# Patient Record
(demographics unavailable — no encounter records)

---

## 2017-10-30 NOTE — NUR
RECIEVED FROM CATH LAB. RIGHT GROIN SOFT WITH DRSG DRY AND INTACT. V/S STABLE.
TELEMERTY SHOWS SR. DENIES ANY NEEDS. FAMILY AT BEDSIDE. WILL MONITOR

## 2017-10-30 NOTE — HEMODYNAMI
PATIENT:DOM CHANG JR                           MEDICAL RECORD: [id number]
: 54                                            LOCATION:[facility]     D.[address]
[facility]# [id number]                                       ADMISSION DATE: 10/30/17
 
 
 Generatedon:10/31/553593:09
Patient name: DOM CHANG Patient #: [id number] Visit #: [id number] SSN: :
 1954 Date
of study: 10/31/2017
Page: Of
Hemodynamic Procedure Report
****************************
Patient Data
Patient Demographics
Procedure consent was obtained
First Name: DOM          Gender: Male
Last Name: BERNARDO            Suffix: Jr Espan[place] Initial: KEVIN      : 1954
Patient #: [id number]       Age: 63 year(s)
Visit #: [id number]       Race: 
Accession #:
[id number]
Additional ID: [id number]
Contact details
Address: [address] [place] [address]      Phone: [phone]
State: [place]
City: [place]
Zip code: [address]
Past Medical History
Allergies: No known allergies
Admission
Admission Data
Admission Date: 10/30/2017  Admission Time: 7:28
Arrival Date: 10/30/2017    Arrival Time: 9:30
Admit Source: Other         Insurance Payor: Private
Room #: D.2120              health insurance
Lab Results
Lab Result Date: 10/30/2017 Lab Result Time: 0:00
Biochemistry
Name         Units    Result                Min      Max
BUN          mg/dl    18       --(---*)--   7        18
Creatinine   mg/dl    1.1      --(--*-)--   0.6      1.3
CBC
Name         Units    Result                Min      Max
Hemoglobin   g/dl     15       --(-*--)--   13.5     17.5
Procedure
Procedure Types
Cath Procedure
Diagnostic Procedure
FFR/IVUS
Intra-Coronary IVUS Initial
PCI Procedure
Coronary Stent Initial
Miscellaneous Procedures
Moderate Sedation up to 15 minutes
Procedure Description
Procedure Date
Procedure Date: 10/31/2017
Procedure Start Time: 10:56
 
Procedure End Time: 11:08
Procedure Staff
Name                            Function
Mike Faulkner MD                Performing Physician
Denver Wilkinson RN              Nurse
Deanna Cruz RT               Scrub
Dandre Curiel RT                  Monitor
Procedure Data
Cath Procedure
Fluoroscopy
Diagnostic fluoroscopy      Total fluoroscopy Time: 1.5
time: 1.5 min               min
Diagnostic fluoroscopy      Total fluoroscopy dose: 160
dose: 160 mGy               mGy
Contrast Material
Contrast Material Type                       Amount (ml)
Isovue 300                                   27
Entry Location
Entry     Primary  Successful  Side  Size  Upsize Upsize Entry    Closure Succes
sful  Closure
Location                             (Fr)  1 (Fr) 2 (Fr) Remarks  Device        
      Remarks
Femoral                        Left  6 Fr                         Exoseal
artery                               Short
Estimated blood loss: 10 ml
Procedure Complications
No complications
Procedure Medications
Medication           Administration Route Dosage
0.9% NaCl            I.V.                 100 ml/hr
Oxygen               NC                   2 l/min
Heparin Flush Bag    added to field       2 bags
(1000units/500ml NS)
Lidocaine 2%         added to field       20
Versed               I.V.                 2 mg
Fentanyl             I.V.                 100 mcg
Fentanyl             I.V.                 50 mcg
Versed               I.V.                 2 mg
Heparin Bolus        I.V.                 4000 units
Fentanyl             I.V.                 50 mcg
Hemodynamics
Rest
HGB: 15 (g/dl) Heart Rate: 65 (bpm)
Snapshots
Pre Cath      Intra         NCS           Post Cath
Vital Signs
Time     Heart  Resp   SPO2 etCO2   NIBP (mmHg) Rhythm  Pain    Sedation
Rate   (ipm)  (%)  (mmHg)                      Status  Level
(bpm)
10:34:07 74     25     100  0       102/76(100) NSR     0 (11)  10(A)
, No
pain
10:39:49 71     13     100  34.7    145/74(123) NSR     0 (11)  10(A)
, No
pain
10:44:32 70     18     100  29.4    131/76(110) NSR     0 (11)  10(A)
, No
pain
10:49:10 64     16     96   29.4    138/73(103) NSR     0 (11)  10(A)
, No
 
pain
10:53:53 70     15     98   30.9    155/73(105) NSR     0 (11)  10(A)
, No
pain
10:58:37 70     16     99   32.4    140/76(101) NSR     0 (11)  10(A)
, No
pain
11:03:20 76     22     100  8.3     142/65(107) NSR     0 (11)  10(A)
, No
pain
11:08:02 73     12     99   30.2    142/80(109) NSR     0 (11)  10(A)
, No
pain
Medications
Time     Medication       Route  Dose  Verified Delivered Reason          Notes 
 Effectiveness
by       by
10:39:07 0.9% NaCl        I.V.   100   Denver  Denver   Per physician
ml/hr Minh Wilkinson RN       RN
10:39:29 Oxygen           NC     2     Denver  Denver   Per physician
l/min Minh Wilkinson RN       RN
10:39:42 Heparin Flush    added  2     Denver  Denver   used for
Bag              to     bags  Minh Wilkinson   procedure
(1000units/500ml field        RN       RN
NS)
10:40:03 Lidocaine 2%     added  20ml  Denver  Denver   for local
to     vial  Lorchad Wilkinson   anesthetic
field        RN       RN
10:53:13 Versed           I.V.   2 mg  Denver  Denver   for sedation
Minh Wilkinson
RN       RN
10:53:21 Fentanyl         I.V.   100   Denver  Denver   for sedation
mcg   Minh Wilkinsno
RN       RN
10:55:53 Fentanyl         I.V.   50    Denver  Denver   for sedation
mcg   Minh Wilkinson RN       RN
10:56:04 Versed           I.V.   2 mg  Denver  Denver   for sedation
Minh Wilkinson RN       RN
10:59:29 Heparin Bolus    I.V.   4000  Denver  Denver   for
units Minh Wilkinson   anticoagulation
RN       RN
11:02:57 Fentanyl         I.V.   50    Denver  Denver   for sedation
mcg   Minh Wilkinson RN       RN
Procedure Log
Time     Note
10:13:50 Denver Wilkinson RN sent for patient. Start room use.
10:13:51 Time tracking: Regular hours
10:13:55 Plan of Care:Hemodynamics will remain stable., Cardiac
rhythm will remain stable., Comfort level will be
maintained., Respiratory function will remain
adequate., Patient/ family verbilizes understanding of
procedure., Procedure tolerated without complication.,
Recovers from procedure without complications..
10:24:54 H&P Date Dictated: 10/30/2017 Within 30 days and on
chart..
 
10:26:42 Lab results completed and on chart.
10:28:37 Patient received from Med II to CCL 1 Alert and
oriented. Tansferred to table in Supine position.
10:28:39 Warm blankets applied, and jorge hugger turned on for
patient comfort.
10:28:39 Correct patient and procedure confirmed by team.
10:28:40 Signed procedure consent form obtained from patient.
10:28:41 ECG and BP/O2 sat monitors applied to patient.
10:33:07 Vital chart was started
10:39:07 0.9% NaCl 100 ml/hr I.V. was administered by Denvre
Lorigan RN; Per physician;
10:39:29 Oxygen 2 l/min NC was administered by Denver Wilkinson
RN; Per physician;
10:39:42 Heparin Flush Bag (1000units/500ml NS) 2 bags added to
field was administered by Denver Wilkinson RN; used for
procedure;
10:40:03 Lidocaine 2% 20ml vial added to field was administered
by Denver Wilkinson RN; for local anesthetic;
10:42:02 Zero performed for pressure channel P1
10:43:36 Baseline sample Acquired.
10:44:11 Rhythm: sinus rhythm
10:44:13 Full Disclosure recording started
10:44:17 Pre-procedure instructions explained to patient.
10:44:17 Pre-op teaching completed and patient verbalized
understanding.
10:44:18 Family in waiting room.
10:44:20 Patient NPO since Midnight.
10:44:24 Patient allergic to No known allergies
10:44:27 Is the patient allergic to Iodine/contrast media? No.
10:44:28 Is patient on blood thinner?Yes
10:44:31 **ACC** The patient was administered the following
blood thiners within the last 24 hours: **ACC**Plavix
10:44:32 Patient diabetic? Yes.
10:44:33 If diabetic: On Metformin? Yes
10:44:38 If on Metformin: Last Dose? 10/28/2017
10:44:42 Previous problem with sedation/anesthesia? No ?
10:44:43 Snore? Yes
10:44:44 Sleep apnea? No
10:44:45 Deviated septum? No
10:44:46 Opens mouth fully? Yes
10:44:47 Sticks out tongue? Yes
10:44:49 Airway obstruction? No ?
10:44:51 Dentures? No ?
10:44:56 Pre procedure: left posterior tibial pulse 1+
Palpable, but thready & weak; easily obliterated
10:45:02 Patient pain scale 0/10 ?.
10:45:26 IV patent on arrival in left hand with 0.9% NaCl at
[facility].
10:45:38 Left groin area was prepped with chlora-prep and
draped in sterile fashion
10:45:39 Alarms reviewed by R. N.
10:45:39 Sharps counted by scrub and verified by R.N.
10:45:42 Use device set Femoral PCI
10:45:43 Tegaderm 4 x 4 opened to sterile field.
10:45:43 Acist Manifold opened to sterile field.
10:45:45 Acist Hand Control opened to sterile field.
10:45:45 Acist Syringe opened to sterile field.
10:45:46 Bag Decanter opened to sterile field.
10:45:46 Medline Cath Pack opened to sterile field.
10:45:46 Terumo 6Fr Java Sheath opened to sterile field.
 
10:45:47 St Jacques 260cm J .035 wire opened to sterile field.
10:45:47 Merit BasixCompak Inflation Kit opened to sterile
field.
10:45:58 Bush Whisper J 300cm 0.014 guide wire opened to
sterile field.
10:45:59 PERCUTANEOUS ENTRY 19GA needle opened to sterile
field.
10:48:26 Zero performed for pressure channel P1
10:50:57 Physician arrived
10:50:57 --------ALL STOP TIME OUT------
10:50:58 Final Timeout: patient, procedure, and site verified
with staff and physician. All members of the team are
in agreement.
10:51:00 Left groin site verified by team.
10:51:06 Physical assessment completed. ASA score P 2 - A
patient with mild systemic disease as per Mike Faulkner MD.
10:51:09 Sedation plan: IV Moderate Sedation Versed, Fentanyl
10:53:13 Versed 2 mg I.V. was administered by Denver Wilkinson
RN; for sedation;
10:53:21 Fentanyl 100 mcg I.V. was administered by Denver Wilkinson RN; for sedation;
10:55:53 Fentanyl 50 mcg I.V. was administered by Denver Wilkinson RN; for sedation;
10:56:04 Versed 2 mg I.V. was administered by Denver Wilkinson
RN; for sedation;
10:56:11 Procedure started.
10:56:15 Local anesthetic to left femerol artery with Lidocaine
2% by Mike Faulkner MD.**INITIAL ACCESS ONLY**
10:56:23 A 6 Fr Short sheath was inserted into the Left Femoral
artery
10:56:30 Medtronic Launcher 6Fr HS I SH guide catheter opened
to sterile field.
10:56:41 6 Fr HSI SH guide catheter was inserted over the wire
10:56:50 Volcano Platinum Eagleye IVUS Catheter opened to
sterile field.
10:57:48 whisper wire advanced.
10:57:52 Wire advanced across lesion.
10:59:29 Heparin Bolus 4000 units I.V. was administered by
Denver Wilkinson RN; for anticoagulation;
10:59:57 IVUS catheter advanced over wire.
10:59:59 IVUS pass to RCA lesion performed.
11:00:00 IVUS catheter removed over wire.
11:01:08 Inflation Number: 1 A Evan OTW 2.75 x 38 stent was
prepped and advanced across the Mid RCA. The stent was
deployed at 17 FRANCOIS for 0:10 (min:sec).
11::26 Stent catheter was removed intact over wire.
11::28 Wire removed.
11:02:14 Cordis 6Fr Exoseal opened to sterile field.
11:02:24 Sheath removed intact; hemostasis achieved with
Exoseal to the Left Femoral artery.
11:02:26 Procedure ended.(Physican Out)
11:02:57 Fentanyl 50 mcg I.V. was administered by Denver Wilkinson RN; for sedation;
11:06:44 Fluoroscopy time 01.50 minutes.
11:06:47 Flurop Dose total: 160
11:06:47 Fluoroscopy dose: 160 mGy
11:06:51 Contrast amount:Isovue 300 27ml.
11:06:52 Sharps counted by scrub and verified by R.N.
11:06:54 Insertion/operative site no bleeding no hematoma.
 
11:06:57 Post-op/insertion site Left Femoral artery dressed
using a 4 x 4 and Tegaderm.
11:07:02 Post left femerol artery:stable, soft, clean and dry
11:07:04 Post Procedure Pulses reassessed and unchanged
11:07:05 Post-procedure physical assessment completed. ASA
score P 2 - A patient with mild systemic disease as
per Mike Faulkner MD.
11:07:07 Post procedure rhythm: unchanged.
11:07:10 Estimated blood loss: 10 ml
11:07:11 Post procedure instruction explained to
patient.Patient verbalizes understanding.
11:07:12 Patient needs reinforcement of post procedure
teaching.
11:08:21 Procedure type changed to Cath procedure, Diagnostic
procedure, FFR/IVUS, Intra-Coronary IVUS Initial, PCI
procedure, Coronary Stent Initial, Miscellaneous
Procedures, Moderate Sedation up to 15 minutes
11:08:42 Procedure and supply charges have been captured,
reviewed, submitted and are correct.
11:08:45 Procedure Complication : No complications
11:08:47 Vital chart was stopped
11:08:47 See physician's report for complete and final results.
11:08:49 Report given to Pre/Post Procedure Room.
11:08:51 Patient transfered to Pre/Post Procedure Room with
Stretcher.
11:08:57 Procedure ended.
11:08:57 Full Disclosure recording stopped
11:09:09 End room use (Document Last)
Intervention Summary
Intervention Notes
Time     ActionType  Lesion and  Equipment Action#  Pressure  Duration
Attributes  Used
11:01:08 Place stent Mid RCA     [place] OTW  1        17        00:10
2.75 x 38
stent
Device Usage
Item Name    Manufacture  Quantity  Catalog      Hospital Part     Current Minim
al Lot# /
Number       Charge   Number   Stock   Stock   Serial#
Code
Tegaderm 4 x 3M           1         [id number]        [id number]   310673   658175  5
4
Acist        Acist        1         46164        489067   667974   831829  5
Manifold     [employer]s Inc
Acist Hand   Acist        1         58665        070901   513748   729322  5
Control      [employer]
Systems Inc
Acist        Acist        1         00269        814825   288650   526077  20
Syringe      Medical
Systems Inc
Bag Decanter Microtek     1         2002S        782769   88955    067151  5
Medical Inc.
Medline Cath Cardinal     1         [id number]    535273   70493    040585  5
TopOPPS         Health
Terumo 6Fr   Terumo       1         MTP284       568593   820729   879290  40
Java
Sheath
St Jacques      St Jacques      1         038172       751786   495816   120843  30
260cm J .035
 
wire
Merit        Merit        1         [id number]       211081   350332   548765  15
BasixCompak  Medical
Inflation
Kit
Bush       Bush       1         [id number]    181036   671167   879035  5
Whisper J    Vascular
300cm 0.014
guide wire
PERCUTANEOUS [employer] 1         [id number]       965679            136355  5
ENTRY 19GA
needle
Medtronic    Medtronic    1         [id number]     867428   66883    816564  1
Launcher 6Fr
HS I SH
guide
catheter
Volcano      Plano      1         [id number]       127259   279284   237437  8
Platinum
Eagleye IVUS
Catheter
[place] OTW     Medtronic    1         [id number]  003643   6497544  213824  5    
   [id number]
2.75 x 38
stent
Cordis 6Fr   Cardinal     1                 590128   401751   872270  10
[facility]      Health
Signature Audit [place]
Stage           Time        Signature      Unsigned
Intra-Procedure 10/31/2017  Dandre Curiel
11:09:38 AM RT(R)
Signatures
Monitor : Dandre Curiel RT Signature :
______________________________
Date : ______________ Time :
______________
 
 
 
 
 
 
 
 
 
 
 
 
 
 
 
 
 
 
 
[place] [id number] [place] [phone]

## 2017-10-30 NOTE — HEMODYNAMI
PATIENT:DOM CHANG JR                           MEDICAL RECORD: [id number]
: 54                                            LOCATION:D.CAT          
ACCT# [id number]                                       ADMISSION DATE: 10/30/17
 
 
 Generatedon:10/30/235393:00
Patient name: DOM CHANG Patient #: [id number] Visit #: [id number] SSN: :
 1954 Date
of study: 10/30/2017
Page: Of
Hemodynamic Procedure Report
****************************
Patient Data
Patient Demographics
Procedure consent was obtained
First Name: DOM          Gender: Male
Last Name: BERNARDO            Suffix: Jr Espana Initial: KEVIN      : 1954
Patient #: [id number]       Age: 63 year(s)
Visit #: [id number]       Race: 
Accession #:
[id number]
Additional ID: [id number]
Contact details
Address: [address] [place] [address]      Phone: [phone]
State: [place]
City: [place]
Zip code: [address]
Admission
Admission Data
Admission Date: 10/30/2017  Admission Time: 7:28
Arrival Date: 10/30/2017    Arrival Time: 9:30
Admit Source: Other         Insurance Payor: Private
health insurance
Lab Results
Lab Result Date: 10/30/2017 Lab Result Time: 0:00
Biochemistry
Name         Units    Result                Min      Max
BUN          mg/dl    18       --(---*)--   7        18
Creatinine   mg/dl    1.1      --(--*-)--   0.6      1.3
CBC
Name         Units    Result                Min      Max
Hemoglobin   g/dl     15       --(-*--)--   13.5     17.5
Procedure
Procedure Types
Cath Procedure
Diagnostic Procedure
LHC
LHC w/Coronaries w/Grafts
PCI Procedure
Coronary Stent Initial x2
Miscellaneous Procedures
Moderate Sedation up to 30 minutes
Procedure Description
Procedure Date
Procedure Date: 10/30/2017
Procedure Start Time: 9:33
Procedure End Time: 9:54
Procedure Staff
 
Name                            Function
Mike Faulkner MD                Performing Physician
Lisa Amos RT               Scrub
Jayce Harden RN                Nurse
Yolanda Alvarez RT                    Monitor
Procedure Data
Cath Procedure
Fluoroscopy
Diagnostic fluoroscopy      Total fluoroscopy Time: 6
time: 6 min                 min
Diagnostic fluoroscopy      Total fluoroscopy dose: 409
dose: 409 mGy               mGy
Contrast Material
Contrast Material Type                       Amount (ml)
Isovue 300                                   127
Entry Location
Entry     Primary  Successful  Side  Size  Upsize Upsize Entry    Closure Succes
sful  Closure
Location                             (Fr)  1 (Fr) 2 (Fr) Remarks  Device        
      Remarks
Femoral                        Right 5 Fr  6 Fr                   Exoseal
artery                                     Short
Estimated blood loss: 5 ml
Diagnostic catheters
Device Type               Used For           End Catheter
Placement
Cordis 5Fr Pigtail        LV Angiography
Catheter (MP)
Cordis 5Fr JL 4.0         Left Coronary
Catheter (MP)             Angiography
Cordis 5Fr 3DRC Catheter  Multi-vessel
(MP)                      Angiography
Diagnostic Infinity 5Fr   Multi-vessel
AR 2 MOD catheter         Angiography
Procedure Complications
No complications
Procedure Medications
Medication           Administration Route Dosage
Oxygen               NC                   2 l/min
Heparin Flush Bag    added to field       2 bags
(1000units/500ml NS)
0.9% NaCl            I.V.                 100 ml/hr
Fentanyl             I.V.                 50 mcg
Versed               I.V.                 1 mg
Fentanyl             I.V.                 50 mcg
Heparin Bolus        I.V.                 4000 units
Integrilin (Bolus    I.V.                 6.8 ml
2mg/ml)
Integrilin (Bolus    wasted               3.2 ml
2mg/ml)
Plavix               P.O.                 600 mg
Hemodynamics
Rest
HGB: 15 (g/dl) Heart Rate: 56 (bpm)
Snapshots
Pre Cath      Intra         NCS           Post Cath
Vital Signs
Time     Heart  Resp   SPO2 etCO2   NIBP (mmHg) Rhythm  Pain    Sedation
 
Rate   (ipm)  (%)  (mmHg)                      Status  Level
(bpm)
9:22:44  55     18     100  0       131/54(87)  NSR     0 (11)  10(A)
, No
pain
9:27:06  55     18     96   37.3    126/70(102) NSR     0 (11)  10(A)
, No
pain
9:31:18  53     16     97   41.8    121/61(106) NSR     0 (11)  10(A)
, No
pain
9:35:36  64     17     100  38.1    128/71(107) NSR     0 (11)  10(A)
, No
pain
9:39:58  73     18     93   51.5    126/64(107) NSR     0 (11)  9(A)
, No
pain
9:44:16  78     18     83   0       119/72(91)  NSR     0 (11)  9(A)
, No
pain
9:48:34  71     18     96   47.7    120/63(99)  NSR     0 (11)  9(A)
, No
pain
9:52:50  74     18     100  46.3    131/69(102) NSR     0 (11)  9(A)
, No
pain
10:00:21 61     22          0       125/74(99)  NSR     0 (11)  9(A)
, No
pain
Medications
Time    Medication       Route  Dose  Verified Delivered Reason          Notes  
Effectiveness
by       by
9:24:44 Oxygen           NC     2     Mike Eduardo    Per physician
l/min Lucie Harden RN
9:24:55 Heparin Flush    added  2     Mike Eduardo    used for
Bag              to     bags  Lucie Haredn RN procedure
(1000units/500ml field
NS)
9:25:06 0.9% NaCl        I.V.   100   Mike Eduardo    Per physician
ml/hr Lucie Harden RN
9:31:01 Fentanyl         I.V.   50    Mike Eduardo    for sedation
mcg   Lucie Harden RN
9:31:08 Versed           I.V.   1 mg  Mike Eduardo    for sedation
Lucie Harden RN
9:35:28 Fentanyl         I.V.   50    Mike Eduardo    for sedation
mcg   Lucie Harden RN
9:39:36 Fentanyl         I.V.   50    Mike Eduardo    for sedation
mcg   Lucie Harden RN
9:43:32 Fentanyl         I.V.   50    Mike Eduardo    for sedation
mcg   Lucie Harden RN
9:43:54 Heparin Bolus    I.V.   4000  Mike Eduardo    for
units Lucie Harden RN anticoagulation
9:44:07 Integrilin       I.V.   6.8   Mike Eduardo    for
(Bolus 2mg/ml)          ml    Lucie Harden RN anticoagulation
9:44:34 Integrilin       wasted 3.2   Mike Eduardo    for
(Bolus 2mg/ml)          ml    Lucie Harden RN anticoagulation
9:59:11 Plavix           P.O.   600   Mike Eduardo    for
mg    Lucie Harden RN antiplatelet
therapy
 
Procedure Log
Time     Note
8:42:35  Informed consent obtained and on chart
8:43:24  Diagnostic Cath Status : Elective
8:43:52  Lisa Amos RT(R) sent for patient. Start room use.
8:43:54  Time tracking: Regular hours
8:43:58  Plan of Care:Hemodynamics will remain stable., Cardiac
rhythm will remain stable., Comfort level will be
maintained., Respiratory function will remain
adequate., Patient/ family verbilizes understanding of
procedure., Procedure tolerated without complication.,
Recovers from procedure without complications..
8:49:06  Admit Source: Other
8:49:11  Arrival Date: 10/30/2017 9:30:00 AM
8:49:19  Insurance Payor : Private health insurance
9:21:27  Patient received from Pre/Post Procedure Room to CCL 3
Alert and oriented. Tansferred to table in Supine
position.
9:21:28  Warm blankets applied, and jorge hugger turned on for
patient comfort.
9:21:28  Correct patient and procedure confirmed by team.
9:21:29  ECG and BP/O2 sat monitors applied to patient.
9:21:30  Vital chart was started
9:21:31  Baseline sample Acquired.
9:21:39  Rhythm: sinus rhythm
9:21:41  Full Disclosure recording started
9:21:49  H&P Date Dictated: 10/30/2017 H&P Addendum completed
by physician on day of procedure. (MUST COMPLETE FOR
ALL OUTPATIENTS), New H&P dictated by physician..
9:21:51  Pre-procedure instructions explained to patient.
9:21:51  Pre-op teaching completed and patient verbalized
understanding.
9:21:53  Family in waiting room.
9:21:55  Patient NPO since Midnight.
9:22:03  Is the patient allergic to Iodine/contrast media? No.
9:22:05  Was the patient premedicated? No
9:22:10  Is patient on blood thinner?Yes
9:22:11  Patient diabetic? Yes.
9:22:13  If diabetic: On Metformin? Yes
9:22:17  If on Metformin: Last Dose? 10/28/2017
9:22:23  Previous problem with sedation/anesthesia? No ?
9:22:29  Snore? Yes
9:22:31  Sleep apnea? No
9:22:33  Deviated septum? No
9:22:57  **ACC** The patient was administered the following
blood thiners within the last 24 hours: **ACC**Aspirin
9:23:03  Opens mouth fully? Yes
9:23:04  Sticks out tongue? Yes
9:23:06  Airway obstruction? No ?
9:23:08  Dentures? No ?
9:23:57  Pre procedure: right dorsailis pedis pulse 2+ Normal;
easily identifiable; not easily obliterated
9:24:02  Pre procedure: left dorsailis pedis pulse 2+ Normal;
easily identifiable; not easily obliterated
9:24:04  Patient pain scale 0/10 ?.
9:24:10  IV patent on arrival in left hand with 0.9% NaCl at
KVO.
9:24:21  Lab results completed and on chart.
9:24:25  Right groin area was prepped with chlora-prep and
draped in sterile fashion
 
9::  Alarms reviewed by R. N.
9::  Sharps counted by scrub and verified by R.N.
9:24:44  Oxygen 2 l/min NC was administered by Jayce Harden
RN; Per physician;
9::55  Heparin Flush Bag (1000units/500ml NS) 2 bags added to
field was administered by Jayce Harden RN; used for
procedure;
9:25:06  0.9% NaCl 100 ml/hr I.V. was administered by Jayce Harden RN; Per physician;
9::59  Lab Result : BUN 18 mg/dl
9::59  Lab Result : Hemoglobin 15 g/dl
9::59  Lab Result : Creatinine 1.1 mg/dl
9:30:29  Physician arrived
9:30:29  --------ALL STOP TIME OUT------
9:30:30  Final Timeout: patient, procedure, and site verified
with staff and physician. All members of the team are
in agreement.
9:30:32  Right groin site verified by team.
9:30:34  Physical assessment completed. ASA score P 2 - A
patient with mild systemic disease as per Mike Faulkner MD.
9:30:37  Sedation plan: IV Moderate Sedation Versed, Fentanyl
9:30:43  Use device set Femoral Dx
9:30:45  Acist Syringe opened to sterile field.
9:30:45  Bag Decanter opened to sterile field.
9:30:46  Medline Cath Pack opened to sterile field.
9:30:46  Terumo 5Fr Lincoln Sheath opened to sterile field.
9:30:46  St Jacques 260cm J .035 wire opened to sterile field.
9:30:48  Acist Hand Control opened to sterile field.
9:30:48  Acist Manifold opened to sterile field.
9:30:49  Diagnostic Infinity 5Fr Multipack catheter opened to
sterile field.
9:30:49  Tegaderm 4 x 4 opened to sterile field.
9:31:01  Fentanyl 50 mcg I.V. was administered by Jayce Harden
RN; for sedation;
9:31:08  Procedure started.
9:31:08  Versed 1 mg I.V. was administered by Jayce Harden RN;
for sedation;
9:33:04  Local anesthetic to right femoral artery with
Lidocaine 2% by Mike Faulkner MD.**INITIAL ACCESS
ONLY**
9:33:11  A 5 Fr sheath was inserted into the Right Femoral
artery
9:33:44  A Cordis 5Fr Pigtail Catheter (MP) was advanced over
the wire and used for LV Angiography.
9:35:13  EF : 50 %
9:35:15  LV gram done using WEBER
9:35:17  Injector settings: Ml/sec: 5, Volume: 15,
9:35:21  Catheter removed.
9:35:25  A Cordis 5Fr JL 4.0 Catheter (MP) was advanced over
the wire and used for Left Coronary Angiography.
9:35:28  Fentanyl 50 mcg I.V. was administered by Jayce Harden
RN; for sedation;
9:35:59  LCA angiography performed.
9:36:48  Injector settings: Ml/sec: 3, Volume: 6,
9:37:01  Merit BasixCompak Inflation Kit opened to sterile
field.
9:37:02  Terumo 6Fr Lincoln Sheath opened to sterile field.
9:37:16  Catheter removed.
9:37:23  A Cordis 5Fr 3DRC Catheter (MP) was advanced over the
 
wire and used for Multi-vessel Angiography.
9:37:26  LIMA angiography performed.
9:37:58  RCA angiography performed.
9:38:01  Injector settings: Ml/sec: 3, Volume: 6,
9:38:30  Catheter removed.
9:39:06  A Diagnostic Infinity 5Fr AR 2 MOD catheter was
advanced over the wire and used for Multi-vessel
Angiography.
9:39:36  Fentanyl 50 mcg I.V. was administered by Jayce Harden
RN; for sedation;
9:39:42  SVG to Circ angiography performed.
9:39:53  Catheter removed.
9:41:43  Proceeding to intervention.
9:41:50  Sheath upsized to a 6 Fr Short.
9:43:07  Cordis 6FR XBLAD 3.5 SH guide catheter opened to
sterile field.
9:43:25  Bush Whisper J 300cm 0.014 guide wire opened to
sterile field.
9:43:32  Fentanyl 50 mcg I.V. was administered by Jayce Harden
RN; for sedation;
9:43:35  6 Fr xblad 3.5 sh guide catheter was inserted over the
wire
9:43:39  whisper wire advanced.
9:43:54  Heparin Bolus 4000 units I.V. was administered by
Jayce Harden RN; for anticoagulation;
9:44:07  Integrilin (Bolus 2mg/ml) 6.8 ml I.V. was administered
by Jayce Harden RN; for anticoagulation;
9:44:09  Wire advanced across lesion.
9:44:34  Integrilin (Bolus 2mg/ml) 3.2 ml wasted was
administered by Jayce Harden RN; for anticoagulation;
9:44:47  Inflation number: 1 A Euphora 2.0 x 20 Balloon was
prepped and advanced across the Ramus, then inflated
to 11 FRANCOIS for 0:10 (min:sec).
9:44:53  Inflation number: 2 The Euphora 2.0 x 20 Balloon was
reinflated across the Ramus, to 11 FRANCOIS for 0:10
(min:sec).
9:45:24  Inflation number: 1 The Euphora 2.0 x 20 Balloon was
reinflated across the LMCA, to 11 FRANCOIS for 0:10
(min:sec).
9:45:57  Balloon removed over the wire.
9:47:42  Inflation Number: 3 A Tracy Rx 2.25 x 38 stent was
prepped and advanced across the Ramus. The stent was
deployed at 11 FRANCOIS for 0:10 (min:sec).
9:48:31  Inflation number: 2 The stent balloon was then
re-inflated across the LMCA to 15 FRANCOIS for 0:10
(min:sec).
9:49:26  Stent catheter was removed intact over wire.
9:51:02  Inflation Number: 3 A Tracy OTW 2.5 x 08 stent was
prepped and advanced across the LMCA. The stent was
deployed at 15 FRANCOIS for 0:10 (min:sec).
9:51:16  Stent catheter was removed intact over wire.
9:51:17  Wire removed.
9:51:17  Guide catheter removed.
9:52:19  Cordis 6Fr Exoseal opened to sterile field.
9:52:48  Sheath removed intact; hemostasis achieved with
Exoseal to the Right Femoral artery.
9:52:51  Procedure ended.(Physican Out)
9:53:16  Fluoroscopy time 06.00 minutes.
9:53:20  Fluoroscopy dose: 409 mGy
9:53:20  Flurop Dose total: 409
 
9:53:24  Contrast amount:Isovue 300 127ml.
9:53:25  Sharps counted by scrub and verified by R.N.
9:53:27  Insertion/operative site no bleeding no hematoma.
9:53:30  Post-op/insertion site Right Femoral artery dressed
using a 4 x 4 and Tegaderm.
9:53:32  Post right femoral artery:stable
9:53:35  Post Procedure Pulses reassessed and unchanged
9:53:37  Post procedure rhythm: unchanged.
9:53:40  Estimated blood loss: 5 ml
9:53:41  Post procedure instruction explained to
patient.Patient verbalizes understanding.
9:53:42  Patient needs reinforcement of post procedure
teaching.
9:54:03  Procedure type changed to Cath procedure, Diagnostic
procedure, LHC, LHC w/Coronaries w/Grafts, PCI
procedure, Coronary Stent Initial x2, Miscellaneous
Procedures, Moderate Sedation up to 30 minutes
9:54:03  Procedure and supply charges have been captured,
reviewed, submitted and are correct.
9:54:08  Procedure Complication : No complications
9:54:10  Vital chart was stopped
9:54:13  See physician's report for complete and final results.
9:54:16  Report given to [facility].
9:54:20  Patient transfered to [facility] with Stretcher.
9:54:24  Procedure ended.
9:54:24  Full Disclosure recording stopped
9:54:31  **ACC-PCI Only** Patient was given prescriptions, or
instructed by Mike Faulkner MD to start/continue the
following medications upon discharge: Plavix
9:54:32  End room use (Document Last)
9:59:11  Plavix 600 mg P.O. was administered by Jayce Harden RN; for antiplatelet therapy;
Intervention Summary
Intervention Notes
Time    ActionType  Lesion and  Equipment Action#  Pressure  Duration
Attributes  Used
9:44:47 Inflate     Ramus       Euphora   1        11        00:10
balloon                 2.0 x 20
Balloon
9:44:53 Reinflate   Ramus       Euphora   2        11        00:10
balloon                 2.0 x 20
Balloon
9:45:24 Reinflate   LMCA        Euphora   1        11        00:10
balloon                 2.0 x 20
Balloon
9:47:42 Place stent Ramus       Tracy Rx   3        11        00:10
2.25 x 38
stent
9:48:31 Reinflate   LMCA        Evan Rx   2        15        00:10
stent                   2.25 x 38
balloon                 stent
9:51:02 Place stent LMCA        Tracy OTW  3        15        00:10
2.5 x 08
stent
Device Usage
Item Name   Manufacture  Quantity  Catalog       Hospital Part     Current Minim
al Lot# /
Number        Charge   Number   Stock   Stock   Serial#
Code
Acist       Acist        1         [id number]         725717   459807   966973  20
 
[employer]     Medical
Systems Inc
Bag         Microtek     1         [id number]         757849   71680    081176  5
[employer]
Medline     Cardinal     1         [id number]     276577   44062    733315  5
Cath Pack   Health
Terumo 5Fr  Terumo       1         [id number]        038044   335995   837655  40
Lincoln
Sheath
St Jacques     St Jacques      1         700325        768350   443567   033243  30
260cm J
.035 wire
Acist Hand  Acist        1         43734         024235   852531   063501  5
Monitoring Division     Medical
Systems Inc
Acist       Acist        1         00681         888665   597813   828671  5
[employer]
Systems Inc
Diagnostic  Cardinal     1         [id number]        915363   39089    728325  30
Infinity    Health
5Fr
Multipack
catheter
Tegaderm 4  3M           1         1626W         598639   597636   274169  5
x 4
Cordis 5Fr  Cardinal     1                                         312513  5
Pigtail     Health
Catheter
(MP)
Cordis 5Fr  Cardinal     1                                         321857  5
JL 4.0      Health
Catheter
(MP)
Merit       Merit        1         [id number]        299601   466703   126926  15
[employer]
Inflation
Kit
Terumo 6Fr  Terumo       1         [id number]        895113   551432   779929  40
Lincoln
Sheath
Cordis 5Fr  Cardinal     1                                         112673  5
3DRC        Health
Catheter
(MP)
Diagnostic  Cardinal     1         [id number]       593733   156000   972946  20
Infinity    Health
5Fr AR 2
MOD
catheter
Cordis 6FR  Cardinal     1         [id number]      895959   039725   663708  3
XBLAD 3.5   Health
SH guide
catheter
Bush      Bush       1         [id number]     603295   086879   490647  5
[facility] J   Vascular
300cm 0.014
guide wire
Euphora 2.0 Medtronic    1         [id number]      895177   788967   700569  5    
   [id number]
x 20
 
Balloon
Tracy Rx     Medtronic    1         [id number]  542261   6811406  436045  5    
   [id number]
2.25 x 38
stent
Evan OTW    Med[employer]onic    1         [id number]   572306   64394    437532  5    
   [id number]
2.5 x 08
stent
Cordis 6Fr  Cardinal     1                  734381   972674   955901  10
[facility]     [employer] Mach[employer]
Signature Audit Elmo
Stage           Time        Signature      Unsigned
Intra-Procedure 10/30/2017  Yolanda Alvarez
10:00:47 AM RT(R)
Signatures
Monitor : Yloanda Alvarez RT   Signature :
______________________________
Date : ______________ Time :
______________
 
 
 
 
 
 
 
 
 
 
 
 
 
 
 
 
 
 
 
 
 
 
 
 
 
 
 
 
 
 
 
 
 
 
 
[facility]                                          
[address] [place] [address]

## 2017-10-30 NOTE — NUR
LYING QUIETLY. RIGHT GROIN
SOFT WITH DRSG DRY AND INTACT. V/S STABLE. SR UP WITH CALL LIGHT IN REACH.
WILL MONITOR

## 2017-10-31 NOTE — OP
PATIENT NAME:  DOM CHANG JR                    MEDICAL RECORD: [id number]
:54                                             LOCATION:D.CAT          
                                                         ADMISSION DATE:        
SURGEON:  NAHOMY SWARTZ MD             
 
 
DATE OF OPERATION:  10/31/2017
 
PROCEDURES:
1.  PTCA stent RCA.
2.  Selective coronary angiography.
3.  Intravascular ultrasound.
 
INDICATION:  Angina and coronary artery disease.
 
PROCEDURE IN DETAIL:  After informed consent was obtained and after a detailed
explanation of the risks, benefits as well as alternative therapies, the patient
elected to proceed with angiogram and angioplasty.  The left femoral area was
prepped and draped in normal sterile fashion.  Left femoral artery was
cannulated via modified Seldinger technique with placement of 6-Greenlandic sheath. 
All catheters exchanged through this sheath.
 
FINDINGS:  The right coronary has greater than 75% long stenosis throughout the
mid portion of the vessel confirmed by intravascular ultrasound.  This was
addressed with a 2.75 x 38 mm Evan stent taken to 17 atmospheres.  Result was 0%
residual stenosis.
 
OVERALL IMPRESSION:  Successful percutaneous transluminal coronary angioplasty
stent of the right coronary artery going from a long area of 75% initial
stenosis to 0% residual.
 
TRANSINT:[id number] Voice Confirmation ID: [id number] DOCUMENT ID: [id number]
                                           
                                           NAHOMY SWARTZ MD             
 
 
 
Electronically Signed by NAHOMY SWARTZ on 10/31/17 at 1642
 
 
 
 
 
 
 
 
 
 
 
 
CC:                                                             [id number]
DICTATION DATE: 10/31/17 1107     :     10/31/17 1153      DEP CLI 
                                                                      10/31/17
[address] [place] [address]

## 2017-10-31 NOTE — OP
PATIENT NAME:  DOM CHANG JR                    MEDICAL RECORD: [id number]
:54                                             LOCATION:D.CAT          
                                                         ADMISSION DATE:        
SURGEON:  NAHOMY SWARTZ MD             
 
 
DATE OF OPERATION:  10/30/2017
 
PROCEDURES:
1.  PTCA stent to ramus intermedius.
2.  PTCA stent to the left main.
3.  Left heart catheterization.
4.  Selective coronary angiography.
5.  LIMA angiography.
6.  Vein graft angiography.
7.  Left ventriculogram.
 
INDICATION:  Angina and coronary artery disease.
 
PROCEDURE IN DETAIL:  After informed consent was obtained and after detailed
explanation of risks, benefits as well as alternative therapies, the patient
elected to proceed with angiogram and angioplasty.  The right femoral area was
prepped and draped in normal sterile fashion.  The right femoral artery was
cannulated via modified Seldinger technique with placement of 6-Swiss sheath. 
All catheters exchanged through this sheath.
 
FINDINGS:  The left ventriculogram was performed in the standard 30-degree WEBER
view reveals preserved ejection fraction at 50%.
 
SELECTIVE CORONARY ANGIOGRAPHY:
1.  The left main has 80% stenosis.
2.  Left anterior descending is totally occluded.
3.  Left circumflex is totally occluded.
4.  LIMA to the LAD and LAD diagonal in a skipped fashion is widely patent.
5.  There is a relatively large ramus intermedius that is nongrafted.  This has
a long area of 80% to 90% stenosis.
6.  The right coronary artery has a 70% to 80% stenosis in the mid vessel.  This
is nongrafted.
 
PTCA STENT OF THE LEFT MAIN AND RAMUS INTERMEDIUS:  The stent in the left main
is a 2.5 x 8 mm Evan and the ramus intermedius is a 2.25 x 38 mm Evan.  Result
was 0% residual stenosis.
 
OVERALL IMPRESSION:  Successful percutaneous transluminal coronary angioplasty
stent of the ramus intermedius and left main going from 80% to 90% initial
stenosis to 0% residual.
 
PLAN:  For PTCA stent of the RCA in the near future.
 
TRANSINT:[id number] Voice Confirmation ID: [id number] DOCUMENT ID: [id number]
 
 
 
OPERATIVE REPORT                               [id number]    BERNARDO RESTREPODOM  
 
 
                                           
                                           NAHOMY SWARTZ MD             
 
 
 
Electronically Signed by NAHOMY SWARTZ on 10/31/17 at 1642
 
 
 
 
 
 
 
 
 
 
 
 
 
 
 
 
 
 
 
 
 
 
 
 
 
 
 
 
 
 
 
 
 
 
 
 
 
 
 
 
 
CC:                                                             [id number]
DICTATION DATE: 10/30/17 0955     :     10/30/17 1124      DEP CLI 
                                                                      10/31/17
[facility]                                          
 [place] [address]

## 2017-10-31 NOTE — HP
PATIENT: DOM CHANG JR                          MEDICAL RECORD: [id number]
ACCOUNT: [id number]                                    LOCATION:D.CAT         
: 54                                            ADMISSION DATE: 10/30/17
                                                         
 
                             HISTORY AND PHYSICAL EXAMINATION
 
 
DIAGNOSES:
1.  Angina.
2.  Abnormal nuclear stress test.
3.  Hypertension.
4.  Hyperlipidemia.
5.  Coronary artery disease.
 
HISTORY OF PRESENT ILLNESS:  This is a gentleman who has had coronary bypass
graft surgery in , began having recurrent anginal symptomatology.  Nuclear
stress test was abnormal with a large reversible perfusion defect laterally and
apically now brought for cardiac catheterization.
 
PHYSICAL EXAMINATION:
GENERAL APPEARANCE:  Well-nourished, well-developed, appears stated age.  Level
of distress, comfortable. 
PSYCHIATRIC:  Mental status, alert, normal affect.  Orientation, oriented to
time, place and person.  
EYES:  Lids and conjunctiva, noninjected.  No discharge, no pallor. 
ENT:  Lips, teeth, gums, normal dentition.  Oropharynx, no cyanosis, no pallor. 
NECK:  Carotid arteries, bilateral normal upstroke, no bruits, no thrills. 
JUGULAR VEINS:  No jugular venous pressure or distention. 
CERVICAL LYMPH NODES:  Nontender, nonenlarged.  
THYROID:  Not enlarged.  Nontender.  No nodules. 
LUNGS:  Respiratory effort, unlabored. 
CHEST:  Normal curvature.  No thoracic deformity.  No chest wall tenderness. 
Percussion, resonant.  Auscultation, clear.  No wheezes, no rales, no rhonchi. 
CARDIOVASCULAR:  Precordial exam, nondisplaced.  No heaves or pericardial
thrills.  Rate and rhythm, regular.  Heart sounds, normal S1, normal S2.  No S3,
no gallop, no rub.  Systolic murmur, not heard.  Diastolic murmur, not heard. 
EXTREMITIES:  No cyanosis, no edema.  Peripheral pulses, full and equal in all
extremities, except as noted.  No bruits appreciated. 
ABDOMEN:  Soft, nondistended.  Normal aorta.  No bruit.  Nontender.  No masses. 
Liver, nontender, no hepatomegaly.  Spleen, nontender, no splenomegaly.  
MUSCULOSKELETAL:  No joint tenderness.  No joint swelling.  No erythema.  
NEUROLOGICAL:  Normal gait, normal strength, normal tone.
SKIN:  Warm and dry.
 
REVIEW OF SYSTEMS:  The patient reports easy bruising but reports no swollen
glands. The patient reports no fever, no night sweats, no significant weight
gain, no significant weight loss.  No significant exercise tolerance.  The
patient reports no dry eyes, no irritation, no vision change.  Patient reports
no difficulty hearing and no ear pain.  Patient reports no frequent nose bleeds
or nose and sinus problems.  Patient reports on arm pain on exertion.   No
shortness of breath while lying down.  No history of heart murmur.  Patient
reports no cough, no wheezing or coughing up blood.  Patient reports no
abdominal pain, no vomiting.  Normal appetite.  No diarrhea and not vomiting
blood.  No nausea and no constipation.  Patient reports no incontinence.  No
difficulty urinating.  No hematuria.  No increased frequency.  Patient reports
no muscle aches.  No weakness, no arthralgias, no back pain.  No swelling of the
extremities.  Patient reports no abnormal mole, no jaundice, no rashes.  Reports
 
 
 
HISTORY AND PHYSICAL                           [id number]    DOM CHANG JR  
 
 
no loss of consciousness.  No weakness and no numbness.  No seizures, dizziness,
or headaches.  The patient reports no depression, no sleep disturbance, feeling
safe in a relationship and no alcohol abuse.  Patient reports on fatigue. 
Reports no runny nose or sinus pressure.  No itching, no hives, and no frequent
sneezing.
 
OVERALL IMPRESSION:  Anginal symptomatology with abnormal nuclear stress test in
a patient with coronary bypass graft surgery 13 years ago, most likely there is
recurrent hemodynamically significant coronary artery disease and/or graft
failure.  We will proceed with coronary angiography.  Further care depends upon
findings of the angiography.
 
TRANSINT:[id number] Voice Confirmation ID: [id number] DOCUMENT ID: [id number]
 
 
                                           
                                           NAHOMY SWARTZ MD             
 
 
 
Electronically Signed by NAHOMY SWARTZ on 10/31/17 at 1642
 
 
 
 
 
 
 
 
 
 
 
 
 
 
 
 
 
 
 
 
 
 
 
 
 
 
CC:                                                             [id number]
DICTATION DATE: 10/30/17 0926     :     10/30/17 1104      DEP CLI 
                                                                      10/31/17
[facility]                                          
[address] [id number]

## 2017-10-31 NOTE — DS
PATIENT:DOM GONCALVES JR            :54   MEDICAL RECORD: [id number]
 
                              DISCHARGE SUMMARY
                                                         
ADMISSION DATE:    10/30/17                       DISCHARGE DATE:     10/31/17
 
 
DIAGNOSES:
1.  Angina.
2.  Coronary artery disease.
3.  Percutaneous transluminal coronary angioplasty stent to right coronary
artery and ramus intermedius this admission.
 
HOSPITAL COURSE:  Mr. Goncalves presents with anginal symptomatology.  He had patent
grafts to the LAD and circumflex.  He; however, had disease of the ramus
intermedius and RCA that were not grafted.  He underwent successful PTCA stent
of both these territories.  Uneventful postop course.  He was discharged home
with the addition of aspirin and Plavix to the medical regimen.  He will follow
up with [facility] in 1 month.
 
TRANSINT:[id number] Voice Confirmation ID: [id number] DOCUMENT ID: [id number]
                                           
                                           NAHOMY SWARTZ MD             
 
 
 
Electronically Signed by NAHOMY SWARTZ on 10/31/17 at 1642
 
 
 
 
 
 
 
 
 
 
 
 
 
 
 
 
 
 
 
 
 
 
 
CC:                                                             [id number]
DICTATION DATE: 10/31/17 1106     :     10/31/17 1419      DEP CLI 
                                                                      10/31/17
[id number] [place] [address]

## 2017-10-31 NOTE — NUR
PT DISCHARGED. IV DCD WITH TIP INTACT. RIGHT GROIN SIFT WITH A SMALL ABOUMT OF
BRUISING NOTED. INSTRUCTIONS GIVEN TO PT AND FAMILY. TO PRIVATE CAR  PER
WHEELCHAIR.

## 2017-10-31 NOTE — NUR
ROUNDING DONE WITH PATIENT OUT OF ROOM, FAMILY MEMBERS IN ROOM AND STATES THAT
THEY ARE IN THE CATH LAB.

## 2017-10-31 NOTE — NUR
BACK FROM CATH LAB. LEFT GROIN SOFT WITH DRSG DRY AND INTACT. V/S STABLE PPP.
FAMILY AT BEDSIDE. WILL MONITOR